# Patient Record
Sex: MALE | Race: WHITE
[De-identification: names, ages, dates, MRNs, and addresses within clinical notes are randomized per-mention and may not be internally consistent; named-entity substitution may affect disease eponyms.]

---

## 2020-07-03 ENCOUNTER — HOSPITAL ENCOUNTER (OUTPATIENT)
Dept: HOSPITAL 75 - ER | Age: 55
Setting detail: OBSERVATION
LOS: 2 days | Discharge: HOME | End: 2020-07-05
Attending: INTERNAL MEDICINE | Admitting: INTERNAL MEDICINE
Payer: COMMERCIAL

## 2020-07-03 VITALS — HEIGHT: 67.72 IN | WEIGHT: 166.23 LBS | BODY MASS INDEX: 25.49 KG/M2

## 2020-07-03 VITALS — SYSTOLIC BLOOD PRESSURE: 112 MMHG | DIASTOLIC BLOOD PRESSURE: 61 MMHG

## 2020-07-03 VITALS — DIASTOLIC BLOOD PRESSURE: 86 MMHG | SYSTOLIC BLOOD PRESSURE: 123 MMHG

## 2020-07-03 VITALS — SYSTOLIC BLOOD PRESSURE: 137 MMHG | DIASTOLIC BLOOD PRESSURE: 85 MMHG

## 2020-07-03 DIAGNOSIS — L03.115: Primary | ICD-10-CM

## 2020-07-03 LAB
ALBUMIN SERPL-MCNC: 3.8 GM/DL (ref 3.2–4.5)
ALP SERPL-CCNC: 86 U/L (ref 40–136)
ALT SERPL-CCNC: 62 U/L (ref 0–55)
BASOPHILS # BLD AUTO: 0 10^3/UL (ref 0–0.1)
BASOPHILS NFR BLD AUTO: 0 % (ref 0–10)
BILIRUB SERPL-MCNC: 0.5 MG/DL (ref 0.1–1)
BUN/CREAT SERPL: 13
CALCIUM SERPL-MCNC: 8.9 MG/DL (ref 8.5–10.1)
CHLORIDE SERPL-SCNC: 105 MMOL/L (ref 98–107)
CO2 SERPL-SCNC: 26 MMOL/L (ref 21–32)
CREAT SERPL-MCNC: 1.04 MG/DL (ref 0.6–1.3)
EOSINOPHIL # BLD AUTO: 0.3 10^3/UL (ref 0–0.3)
EOSINOPHIL NFR BLD AUTO: 2 % (ref 0–10)
ERYTHROCYTE [DISTWIDTH] IN BLOOD BY AUTOMATED COUNT: 12.8 % (ref 10–14.5)
GFR SERPLBLD BASED ON 1.73 SQ M-ARVRAT: > 60 ML/MIN
GLUCOSE SERPL-MCNC: 94 MG/DL (ref 70–105)
HCT VFR BLD CALC: 40 % (ref 40–54)
HGB BLD-MCNC: 13.7 G/DL (ref 13.3–17.7)
LYMPHOCYTES # BLD AUTO: 1.9 X 10^3 (ref 1–4)
LYMPHOCYTES NFR BLD AUTO: 18 % (ref 12–44)
MANUAL DIFFERENTIAL PERFORMED BLD QL: NO
MCH RBC QN AUTO: 30 PG (ref 25–34)
MCHC RBC AUTO-ENTMCNC: 34 G/DL (ref 32–36)
MCV RBC AUTO: 88 FL (ref 80–99)
MONOCYTES # BLD AUTO: 1.2 X 10^3 (ref 0–1)
MONOCYTES NFR BLD AUTO: 12 % (ref 0–12)
NEUTROPHILS # BLD AUTO: 7.2 X 10^3 (ref 1.8–7.8)
NEUTROPHILS NFR BLD AUTO: 68 % (ref 42–75)
PLATELET # BLD: 228 10^3/UL (ref 130–400)
PMV BLD AUTO: 11.4 FL (ref 7.4–10.4)
POTASSIUM SERPL-SCNC: 3.9 MMOL/L (ref 3.6–5)
PROT SERPL-MCNC: 6.9 GM/DL (ref 6.4–8.2)
SODIUM SERPL-SCNC: 142 MMOL/L (ref 135–145)
WBC # BLD AUTO: 10.7 10^3/UL (ref 4.3–11)

## 2020-07-03 PROCEDURE — 96365 THER/PROPH/DIAG IV INF INIT: CPT

## 2020-07-03 PROCEDURE — 85025 COMPLETE CBC W/AUTO DIFF WBC: CPT

## 2020-07-03 PROCEDURE — 36415 COLL VENOUS BLD VENIPUNCTURE: CPT

## 2020-07-03 PROCEDURE — 80053 COMPREHEN METABOLIC PANEL: CPT

## 2020-07-03 PROCEDURE — 87040 BLOOD CULTURE FOR BACTERIA: CPT

## 2020-07-03 PROCEDURE — 80048 BASIC METABOLIC PNL TOTAL CA: CPT

## 2020-07-03 PROCEDURE — 96361 HYDRATE IV INFUSION ADD-ON: CPT

## 2020-07-03 PROCEDURE — 80202 ASSAY OF VANCOMYCIN: CPT

## 2020-07-03 RX ADMIN — VANCOMYCIN HYDROCHLORIDE SCH MLS/HR: 750 INJECTION, POWDER, LYOPHILIZED, FOR SOLUTION INTRAVENOUS at 14:04

## 2020-07-03 RX ADMIN — SODIUM CHLORIDE SCH MLS/HR: 900 INJECTION, SOLUTION INTRAVENOUS at 21:15

## 2020-07-03 RX ADMIN — VANCOMYCIN HYDROCHLORIDE SCH MLS/HR: 750 INJECTION, POWDER, LYOPHILIZED, FOR SOLUTION INTRAVENOUS at 15:22

## 2020-07-03 RX ADMIN — Medication SCH ML: at 22:06

## 2020-07-03 NOTE — NUR
SAMUEL JULIA admitted to room 414-1, with an admitting diagnosis of RIGHT LOWER LEG 
CELLULITIS, on 07/03/20 from ER via W/C, accompanied by STAFF.SAMUEL DUMONT introduced to 
surroundings, call light, bed controls, phone, TV, temperature control, lights, meal times, 
smoking policy, visitor policy, side rail policy, bathrooms and showers.  Patient Rights 
given to patient in the handbook.SAMUEL DUMONT verbalizes understanding that [facility] is 
not responsible for the loss or damage to any personal effects or valuables that are kept in 
the patients posession during their hospitalization.  The following Patient Care Plans were 
discussed with the PT: Discharge Planning, PAIN CONTROL,IV THERAPY, and TESTS AND 
PROCEDURES. SAMUEL DUMONT verbalizes understanding of Interdisciplinary Patient Education. 
Patient and/or family were informed about the Rapid Response Team and its purpose.

## 2020-07-03 NOTE — XMS REPORT
Continuity of Care Document

                             Created on: 2020



SAMUEL DUMONT

External Reference #: [id number]

: 1965

Sex: Male



Demographics





                          Address                   [address], [place] [address]

 

                          Home Phone                [phone] x

 

                          Preferred Language        Unknown

 

                          Marital Status            Unknown

 

                          Anabaptism Affiliation     Unknown

 

                          Race                      Unknown

 

                          Ethnic Group              Unknown





Author





                          Organization              Unknown

 

                          Address                   Unknown

 

                          Phone                     Unavailable



              



Allergies

      



There is no data.                  



Medications

      



There is no data.                  



Problems

      



             Date Dx Coded           Attending           Type           Code    

       

Diagnosis                               Diagnosed By        

 

           2015                       Ot           592.0                  

           

  

 

           2015                       Ot           592.0                  

           

  

 

           2015                       Ot           592.0                  

           

  

 

             2016                        Ot           592.0           [facility]

ULUS OF KIDNEY

                                                 

 

             2016                        Ot           592.0           [facility]

ULUS OF KIDNEY

                                                 

 

             2018                        Ot           592.0           [facility]

ULUS OF KIDNEY

                                                 



                            



Procedures

      



There is no data.                  



Results

      



There is no data.              



Encounters

      



                ACCT No.           Visit Date/Time           Discharge          

 Status         

             Pt. Type           Provider           Facility           Loc./Unit 

          

Complaint        

 

             [id number]           2020 12:20:00                        A

CT           

Emergency                 PACO THURSTON, JOANNA GARCIA           [facility]                 ER                        R LEG RED SWOLLEN        

 

             [id number]           2013 07:02:00                         

            

Document Registration

## 2020-07-03 NOTE — ED LOWER EXTREMITY
General


Chief Complaint:  Lower Extremity


Stated Complaint:  R LEG RED SWOLLEN


Source:  patient


Exam Limitations:  no limitations





History of Present Illness


Date Seen by Provider:  Jul 3, 2020


Time Seen by Provider:  13:23


Initial Comments


Patient presents ER by private conveyance with chief complaint that her 4 days 

ago he noticed some redness swelling heat and tenderness coming on the right 

shin. No drainage. He went to Dr. Blas who put him on Rocephin and some type of

cephalosporin pill. Be followed up yesterday with Dr. Morley, General Surgery 

who lanced the wound but did not get any purulence out. Only to get sanguis 

drainage. Thought the wound looked like it might of been from a spider bite to 

put him on dapsone. Patient said it was getting worse spreading up his thigh and

having some more pain today so he was directed to the ER by Dr. Morley. Dr. Morley came to the ER and saw the patient. He agrees redness and swelling is 

worse today than before. It is not circumferential. Patient does not have 

numbness or tingling in his foot. He has full range of motion of his toes. He 

does not have any significant medical history nor take any other medications 

besides the antibiotics. No fevers or chills nausea vomiting or diarrhea.





Allergies and Home Medications


Allergies


Coded Allergies:  


     No Known Drug Allergies (Unverified , 7/3/20)





Patient Home Medication List


Home Medication List Reviewed:  Yes





Review of Systems


Constitutional:  No chills, No diaphoresis, No fever, No malaise


EENTM:  No ear pain, No eye pain


Respiratory:  No cough, No short of breath


Cardiovascular:  No Hx of Intervention, No syncope, No vascular heart diseas


Gastrointestinal:  No abdominal pain, No constipation, No diarrhea, No nausea


Genitourinary:  No discharge, No dysuria


Musculoskeletal:  No back pain, No joint pain


Skin:  see HPI





All Other Systems Reviewed


Negative Unless Noted:  Yes





Past Medical-Social-Family Hx


Patient Social History


Alcohol Use:  Denies Use


Recreational Drug Use:  No


Smoking Status:  Never a Smoker


Recent Foreign Travel:  No


Contact w/Someone Who Travel:  No





Physical Exam


Vital Signs





Vital Signs - First Documented








 7/3/20





 12:30


 


Temp 36.7


 


Pulse 64


 


Resp 18


 


B/P (MAP) 130/81 (97)


 


Pulse Ox 98





Capillary Refill :


Height, Weight, BMI


Height: '"


Weight: lbs. oz. kg;  BMI


Method:


General Appearance:  WD/WN, mild distress


HEENT:  PERRL/EOMI, pharynx normal


Cardiovascular:  normal peripheral pulses, regular rate, rhythm


Respiratory:  no respiratory distress, no accessory muscle use


Legs:  left leg non-tender, left leg normal inspection, left leg normal range of

motion, left leg no evidence of injury; right leg pain, right leg soft tissue 

tenderness, right leg swelling (the erythema with a previous incision site 

roughly 1 cm on the anterior shin mid shaft right leg. Fluctuance palpable but 

no purulence expressed. Erythema marked with a marker and spreading up posterior

thigh approximately 4-5 cm.)





Progress/Results/Core Measures


Results/Orders


Lab Results





Laboratory Tests








Test


 7/3/20


12:55 Range/Units


 


 


White Blood Count


 10.7 


 4.3-11.0


10^3/uL


 


Red Blood Count


 4.59 


 4.35-5.85


10^6/uL


 


Hemoglobin 13.7  13.3-17.7  G/DL


 


Hematocrit 40  40-54  %


 


Mean Corpuscular Volume 88  80-99  FL


 


Mean Corpuscular Hemoglobin 30  25-34  PG


 


Mean Corpuscular Hemoglobin


Concent 34 


 32-36  G/DL





 


Red Cell Distribution Width 12.8  10.0-14.5  %


 


Platelet Count


 228 


 130-400


10^3/uL


 


Mean Platelet Volume 11.4 H 7.4-10.4  FL


 


Neutrophils (%) (Auto) 68  42-75  %


 


Lymphocytes (%) (Auto) 18  12-44  %


 


Monocytes (%) (Auto) 12  0-12  %


 


Eosinophils (%) (Auto) 2  0-10  %


 


Basophils (%) (Auto) 0  0-10  %


 


Neutrophils # (Auto) 7.2  1.8-7.8  X 10^3


 


Lymphocytes # (Auto) 1.9  1.0-4.0  X 10^3


 


Monocytes # (Auto) 1.2 H 0.0-1.0  X 10^3


 


Eosinophils # (Auto)


 0.3 


 0.0-0.3


10^3/uL


 


Basophils # (Auto)


 0.0 


 0.0-0.1


10^3/uL


 


Sodium Level 142  135-145  MMOL/L


 


Potassium Level 3.9  3.6-5.0  MMOL/L


 


Chloride Level 105    MMOL/L


 


Carbon Dioxide Level 26  21-32  MMOL/L


 


Anion Gap 11  5-14  MMOL/L


 


Blood Urea Nitrogen 14  7-18  MG/DL


 


Creatinine


 1.04 


 0.60-1.30


MG/DL


 


Estimat Glomerular Filtration


Rate > 60 


  





 


BUN/Creatinine Ratio 13   


 


Glucose Level 94    MG/DL


 


Calcium Level 8.9  8.5-10.1  MG/DL


 


Corrected Calcium 9.1  8.5-10.1  MG/DL


 


Total Bilirubin 0.5  0.1-1.0  MG/DL


 


Aspartate Amino Transf


(AST/SGOT) 45 H


 5-34  U/L





 


Alanine Aminotransferase


(ALT/SGPT) 62 H


 0-55  U/L





 


Alkaline Phosphatase 86    U/L


 


Total Protein 6.9  6.4-8.2  GM/DL


 


Albumin 3.8  3.2-4.5  GM/DL








My Orders





Orders - JOANNA ANTONIO


Cbc With Automated Diff (7/3/20 13:10)


Comprehensive Metabolic Panel (7/3/20 13:10)


Blood Culture (7/3/20 13:10)


Ed Iv/Invasive Line Start (7/3/20 13:43)


Ns Iv 1000 Ml (Sodium Chloride 0.9%) (7/3/20 13:43)





Vital Signs/I&O











 7/3/20





 12:30


 


Temp 36.7


 


Pulse 64


 


Resp 18


 


B/P (MAP) 130/81 (97)


 


Pulse Ox 98











Progress


Progress Note :  


   Time:  13:58


Progress Note


Aseptic vital signs. She has been on 3 days of outpatient antibiotics including 

Rocephin. Plan to put him on vancomycin for MRSA coverage and Zosyn and observe 

him overnight. Internal Medicine can downgrade this in the morning if he is 

clinically improving. Labs are unremarkable.





Departure


Communication (Admissions)


Time/Spoke to Admitting Phy:  13:50


Discussed the case with Dr. Garcia and he agrees to observe the patient on 

antibiotics.


Time/Spoke to Consulting Phy:  13:30


Dr. Morley agrees to consult. He says he will check out to Dr. Ron arambula.





Impression





   Primary Impression:  


   Cellulitis and abscess of leg, except foot


Disposition:  09 ADMITTED AS INPATIENT


Condition:  Stable





Admissions


Decision to Admit Reason:  Admit from ER (General)


Decision to Admit/Date:  Jul 3, 2020


Time/Decision to Admit Time:  13:30





Departure-Patient Inst.


Referrals:  


NO,LOCAL PHYSICIAN (PCP/Family)


Primary Care Physician











JOANNA ANTONIO                  Jul 3, 2020 14:00

## 2020-07-03 NOTE — NUR
CR 1.04; CR CL > 60; WT 75.4 KG; VANCO 1750 MG GIVEN IN ER; CONTINUE WITH VANCO 1000 MG IV 
Q12H; TROUGH AFTER 4TH DOSE

## 2020-07-03 NOTE — XMS REPORT
Continuity of Care Document

                             Created on: 2020



SAMUEL DUMONT

External Reference #: [id number]

: 1965

Sex: Male



Demographics





                          Address                   [address] [address]  [address]

 

                          Home Phone                [phone] x

 

                          Preferred Language        Unknown

 

                          Marital Status            Unknown

 

                          Zoroastrian Affiliation     Unknown

 

                          Race                      Unknown

 

                          Ethnic Group              Unknown





Author





                          Organization              Unknown

 

                          Address                   Unknown

 

                          Phone                     Unavailable



              



Allergies

      



             Active           Description           Code           Type         

  Severity   

                Reaction           Onset           Reported/Identified          

 

Relationship to Patient                 Clinical Status        

 

                Yes             No Known Drug Allergies           [id number]    

       Drug 

Allergy           Unknown           N/A                             2020  

      

                                                             



                  



Medications

      



There is no data.                  



Problems

      



             Date Dx Coded           Attending           Type           Code    

       

Diagnosis                               Diagnosed By        

 

           2015                       Ot           592.0                  

           

  

 

           2015                       Ot           592.0                  

           

  

 

           2015                       Ot           592.0                  

           

  

 

             2016                        Ot           592.0           [facility]

ULUS OF KIDNEY

                                                 

 

             2016                        Ot           592.0           [facility]

ULUS OF KIDNEY

                                                 

 

             2018                        Ot           592.0           [facility]

ULUS OF KIDNEY

                                                 



                            



Procedures

      



There is no data.                  



Results

      



                    Test                Result              Range        

 

                                        Complete blood count (CBC) with automate

d white blood cell (WBC) differential - 

20 12:55         

 

                          Blood leukocytes automated count (number/volume)      

     10.7 10*3/uL         

                                        4.3-11.0        

 

                          Blood erythrocytes automated count (number/volume)    

       4.59 10*6/uL       

                                        4.35-5.85        

 

                    Venous blood hemoglobin measurement (mass/volume)           

13.7 g/dL           

13.3-17.7        

 

                    Blood hematocrit (volume fraction)           40 %           

     40-54        

 

                    Automated erythrocyte mean corpuscular volume           88 [

foz_us]           

80-99        

 

                                        Automated erythrocyte mean corpuscular h

emoglobin (mass per erythrocyte)        

                          30 pg                     25-34        

 

                                        Automated erythrocyte mean corpuscular h

emoglobin concentration measurement 

(mass/volume)             34 g/dL                   32-36        

 

                    Automated erythrocyte distribution width ratio           12.

8 %              10.0-

14.5        

 

                    Automated blood platelet count (count/volume)           228 

10*3/uL           

130-400        

 

                          Automated blood platelet mean volume measurement      

     11.4 [foz_us]        

                                        7.4-10.4        

 

                    Automated blood neutrophils/100 leukocytes           68 %   

             42-75       

 

 

                    Automated blood lymphocytes/100 leukocytes           18 %   

             12-44       

 

 

                    Blood monocytes/100 leukocytes           12 %               

 0-12        

 

                    Automated blood eosinophils/100 leukocytes           2 %    

             0-10        

 

                    Automated blood basophils/100 leukocytes           0 %      

           0-10        

 

                    Blood neutrophils automated count (number/volume)           

7.2 10*3            

1.8-7.8        

 

                    Blood lymphocytes automated count (number/volume)           

1.9 10*3            

1.0-4.0        

 

                    Blood monocytes automated count (number/volume)           1.

2 10*3            

0.0-1.0        

 

                    Automated eosinophil count           0.3 10*3/uL           0

.0-0.3        

 

                    Automated blood basophil count (count/volume)           0.0 

10*3/uL           

0.0-0.1        

 

                                        Comprehensive metabolic panel - 20

 12:55         

 

                          Serum or plasma sodium measurement (moles/volume)     

      142 mmol/L          

                                        135-145        

 

                          Serum or plasma potassium measurement (moles/volume)  

         3.9 mmol/L       

                                        3.6-5.0        

 

                          Serum or plasma chloride measurement (moles/volume)   

        105 mmol/L        

                                                

 

                    Carbon dioxide           26 mmol/L           21-32        

 

                          Serum or plasma anion gap determination (moles/volume)

           11 mmol/L      

                                        5-14        

 

                          Serum or plasma urea nitrogen measurement (mass/volume

)           14 mg/dL      

                                        7-18        

 

                          Serum or plasma creatinine measurement (mass/volume)  

         1.04 mg/dL       

                                        0.60-1.30        

 

                    Serum or plasma urea nitrogen/creatinine mass ratio         

  13                  NRG 

       

 

                                        Serum or plasma creatinine measurement w

ith calculation of estimated glomerular 

filtration rate           >                         NRG        

 

                    Serum or plasma glucose measurement (mass/volume)           

94 mg/dL            

        

 

                    Serum or plasma calcium measurement (mass/volume)           

8.9 mg/dL           

8.5-10.1        

 

                          Serum or plasma total bilirubin measurement (mass/volu

me)           0.5 mg/dL   

                                        0.1-1.0        

 

                                        Serum or plasma alkaline phosphatase kalyan

surement (enzymatic activity/volume)    

                          86 U/L                            

 

                                        Serum or plasma aspartate aminotransfera

se measurement (enzymatic 

activity/volume)           45 U/L                    5-34        

 

                                        Serum or plasma alanine aminotransferase

 measurement (enzymatic activity/volume)

                          62 U/L                    0-55        

 

                    Serum or plasma protein measurement (mass/volume)           

6.9 g/dL            

6.4-8.2        

 

                    Serum or plasma albumin measurement (mass/volume)           

3.8 g/dL            

3.2-4.5        

 

                    CALCIUM CORRECTED           9.1 mg/dL           8.5-10.1    

    



                  



Encounters

      



                ACCT No.           Visit Date/Time           Discharge          

 Status         

             Pt. Type           Provider           Facility           Loc./Unit 

          

Complaint        

 

             [id number]           2020 13:50:00                        A

CT           

Inpatient                 MATILDA THURSTON, DREW COTA           [facility]                 4TH                       CELLULITIS RLE        

 

             [id number]           2013 07:02:00                         

            

Document Registration

## 2020-07-04 VITALS — DIASTOLIC BLOOD PRESSURE: 67 MMHG | SYSTOLIC BLOOD PRESSURE: 117 MMHG

## 2020-07-04 VITALS — SYSTOLIC BLOOD PRESSURE: 119 MMHG | DIASTOLIC BLOOD PRESSURE: 65 MMHG

## 2020-07-04 VITALS — DIASTOLIC BLOOD PRESSURE: 67 MMHG | SYSTOLIC BLOOD PRESSURE: 127 MMHG

## 2020-07-04 VITALS — DIASTOLIC BLOOD PRESSURE: 69 MMHG | SYSTOLIC BLOOD PRESSURE: 123 MMHG

## 2020-07-04 VITALS — SYSTOLIC BLOOD PRESSURE: 106 MMHG | DIASTOLIC BLOOD PRESSURE: 59 MMHG

## 2020-07-04 VITALS — SYSTOLIC BLOOD PRESSURE: 106 MMHG | DIASTOLIC BLOOD PRESSURE: 58 MMHG

## 2020-07-04 LAB
BASOPHILS # BLD AUTO: 0 10^3/UL (ref 0–0.1)
BASOPHILS NFR BLD AUTO: 0 % (ref 0–10)
BUN/CREAT SERPL: 9
CALCIUM SERPL-MCNC: 8.9 MG/DL (ref 8.5–10.1)
CHLORIDE SERPL-SCNC: 106 MMOL/L (ref 98–107)
CO2 SERPL-SCNC: 25 MMOL/L (ref 21–32)
CREAT SERPL-MCNC: 1.29 MG/DL (ref 0.6–1.3)
EOSINOPHIL # BLD AUTO: 0.3 10^3/UL (ref 0–0.3)
EOSINOPHIL NFR BLD AUTO: 3 % (ref 0–10)
ERYTHROCYTE [DISTWIDTH] IN BLOOD BY AUTOMATED COUNT: 12.6 % (ref 10–14.5)
GFR SERPLBLD BASED ON 1.73 SQ M-ARVRAT: 58 ML/MIN
GLUCOSE SERPL-MCNC: 98 MG/DL (ref 70–105)
HCT VFR BLD CALC: 39 % (ref 40–54)
HGB BLD-MCNC: 13.1 G/DL (ref 13.3–17.7)
LYMPHOCYTES # BLD AUTO: 1.8 X 10^3 (ref 1–4)
LYMPHOCYTES NFR BLD AUTO: 17 % (ref 12–44)
MANUAL DIFFERENTIAL PERFORMED BLD QL: NO
MCH RBC QN AUTO: 29 PG (ref 25–34)
MCHC RBC AUTO-ENTMCNC: 33 G/DL (ref 32–36)
MCV RBC AUTO: 88 FL (ref 80–99)
MONOCYTES # BLD AUTO: 1.2 X 10^3 (ref 0–1)
MONOCYTES NFR BLD AUTO: 11 % (ref 0–12)
NEUTROPHILS # BLD AUTO: 7.5 X 10^3 (ref 1.8–7.8)
NEUTROPHILS NFR BLD AUTO: 70 % (ref 42–75)
PLATELET # BLD: 238 10^3/UL (ref 130–400)
PMV BLD AUTO: 11.5 FL (ref 7.4–10.4)
POTASSIUM SERPL-SCNC: 4 MMOL/L (ref 3.6–5)
SODIUM SERPL-SCNC: 141 MMOL/L (ref 135–145)
WBC # BLD AUTO: 10.8 10^3/UL (ref 4.3–11)

## 2020-07-04 RX ADMIN — VANCOMYCIN HYDROCHLORIDE SCH MLS/HR: 500 INJECTION, POWDER, LYOPHILIZED, FOR SOLUTION INTRAVENOUS at 02:19

## 2020-07-04 RX ADMIN — Medication SCH ML: at 22:16

## 2020-07-04 RX ADMIN — SODIUM CHLORIDE SCH MLS/HR: 900 INJECTION, SOLUTION INTRAVENOUS at 13:40

## 2020-07-04 RX ADMIN — VANCOMYCIN HYDROCHLORIDE SCH MLS/HR: 500 INJECTION, POWDER, LYOPHILIZED, FOR SOLUTION INTRAVENOUS at 13:56

## 2020-07-04 RX ADMIN — SODIUM CHLORIDE SCH MLS/HR: 900 INJECTION, SOLUTION INTRAVENOUS at 05:46

## 2020-07-04 RX ADMIN — IBUPROFEN PRN MG: 800 TABLET, FILM COATED ORAL at 14:02

## 2020-07-04 RX ADMIN — Medication SCH ML: at 06:01

## 2020-07-04 RX ADMIN — SODIUM CHLORIDE SCH MLS/HR: 900 INJECTION, SOLUTION INTRAVENOUS at 22:16

## 2020-07-04 RX ADMIN — Medication SCH ML: at 13:56

## 2020-07-04 NOTE — HISTORY & PHYSICAL-HOSPITALIST
History of Present Illness


HPI/Chief Complaint


Initially the patient reports couple weeks ago noticing increased dryness and 

itching of his legs especially the right one down around the ankle and lower 

tibial area.  Last Saturday he noted a little pain and some swelling in the mid 

tibial area got progressively worse and he presented to Dr. Carrillo's office 

on Tuesday where he had evidence for what sounds like an abscess.  He was 

concerned that it might be a spider bite but was not aware of any insect bites 

and not seen any spiders.  He was started on cephalexin but swelling got worse 

and he was referred to Dr. Morley who attempted to duane nodule in the distal 

tibia from description they were unable to get the purulent material.  On 

cephalexin swelling up progressively worse he had now some chills and fever 

Friday afternoon or early evening which prompted him to come to the hospital.  

He also started noticing redness moving up to the medial thigh area with 

increasing pain around the nodule sided redness spreading out from it as well.  

He denied any drainage and is had no past history of MRSA skin infection or any 

other type of infection.  He is typically healthy on no regular medication and 

works out on a regular basis.  He has not had any known exposure to COVID or 

anyone with MRSA that he is aware of.


Date Seen


20


Time Seen by a Provider:  11:00


Attending Physician


Drew Garcia MD


PCP


No,Local Physician


Referring Physician





Date of Admission


Jul 3, 2020 at 13:50





Home Medications & Allergies


Home Medications


Reviewed patient Home Medication Reconciliation performed by pharmacy medication

reconciliations technician and/or nursing.


Patients Allergies have been reviewed.





Allergies





Allergies


Coded Allergies


  No Known Drug Allergies (Unverified7/3/20)








Past Medical-Social-Family Hx


Past Med/Social Hx:  Reviewed and Corrections made


Patient Social History


Alcohol Use:  Denies Use


Alcohol Beverage of Choice:  Beer


Recreational Drug Use:  No


Smoking Status:  Never a Smoker


Physical Abuse Screen:  No


Sexual Abuse:  No


Recent Foreign Travel:  No


Contact w/other who traveled:  No


Recent Hopitalizations:  No


Recent Infectious Disease Expo:  No





Seasonal Allergies


Seasonal Allergies:  No





Past Medical History


History of Blood Disorders:  No


Adverse Reaction to Blood Bryson:  No





Family History


No Pertinent Family Hx





Review of Systems


Constitutional:  see HPI, chills, diaphoresis, fever, weakness





Physical Exam


Physical Exam


Vital Signs





Vital Signs - First Documented








 7/3/20 7/3/20





 12:30 15:05


 


Temp 36.7 


 


Pulse 64 


 


Resp 18 


 


B/P (MAP) 130/81 (97) 


 


Pulse Ox 98 


 


O2 Delivery  Room Air





Capillary Refill : Less Than 3 Seconds


Height, Weight, BMI


Height: '"


Weight: lbs. oz. kg; 25.48 BMI


Method:


General Appearance:  No Apparent Distress, WD/WN


HEENT:  PERRL/EOMI, TMs Normal, Normal ENT Inspection, Pharynx Normal, Moist 

Mucous Membranes


Respiratory:  Chest Non Tender, Lungs Clear, Normal Breath Sounds, No Accessory 

Muscle Use, No Respiratory Distress


Cardiovascular:  Regular Rate, Rhythm, No Edema, No Gallop, No JVD, No Murmur, 

Normal Peripheral Pulses


Gastrointestinal:  Normal Bowel Sounds, No Organomegaly, No Pulsatile Mass, Non 

Tender, Soft


Extremity:  Other (2 x 3 cm erythematous nodule with a small adjacent incision 

which is erythematous and slightly tender.  There is no evidence of for drainage

there is erythema extending out from this man extending up the right medial 

calf.  There appears to be less erythema than what is marked out by the 

emergency room physician last night and he reports decrease in pain and 

swelling.  Left lower extremity is unremarkable no skin is a dry no evidence for

tenia pedis is noted.  Left lower extremity is unremarkable with no swelling)


Neurologic/Psychiatric:  Alert, Oriented x3, No Motor/Sensory Deficits, Normal 

Mood/Affect





Results


Results/Procedures


Labs


Laboratory Tests


7/3/20 12:55








20 05:11








Patient resulted labs reviewed.





Assessment/Plan


Admission Diagnosis


A/P 1.  Cellulitis with ascending lymphangitis and sepsis not severe suspect 

underlying staph continue vancomycin and for now continue Zosyn and check 

outpatient culture results.  Blood cultures pending.


2.  Patient low risk for DVT risk of anticoagulation outweigh potential benefits

due to lower extremity cellulitis not a candidate for SCDs.


Admission Status:  Inpatient Order (span 2 midnights)


Reason for Inpatient Admission:  


As per above.





Clinical Quality Measures


DVT/VTE Risk/Contraindication:


Risk Factor Score Per Nursin


RFS Level Per Nursing on Admit:  2=Moderate





Copy


Copies To 1:   DREW GARCIA MD, MARK D MD               2020 12:28

## 2020-07-04 NOTE — PROGRESS NOTE
Subjective


Date Seen by a Provider:  2020


Time Seen by a Provider:  10:30


Subjective/Events-last exam


Patient seen with Dr. Velasquez.  Patient reports the RLE is improving.  Having 

little pain but is tolerable with pain meds.  Did have a temp last night, but 

pain reports that he gets hot after taking pain medications and was sweating.  

Denies any drainage.





Objective


Exam





Vital Signs








  Date Time  Temp Pulse Resp B/P (MAP) Pulse Ox O2 Delivery O2 Flow Rate FiO2


 


20 09:00     93 Room Air  


 


20 08:00 36.4 52 20 127/67 (87) 96 Room Air  


 


20 04:00 37.6 59 18 123/69 (87) 92 Room Air  


 


20 00:00 37.8 57 18 106/58 (74) 95 Room Air  


 


7/3/20 21:00     93 Room Air  


 


7/3/20 19:27 38.1 64 18 112/61 (78) 93 Room Air  


 


7/3/20 17:43     98 Room Air  


 


7/3/20 15:12 36.9 62 18 137/85 96 Room Air  


 


7/3/20 15:05 36.9 78 18 123/86 (98) 99 Room Air  


 


7/3/20 14:50  64 18 122/78 98   


 


7/3/20 12:30 36.7 64 18 130/81 (97) 98   














I & O 


 


 20





 07:00


 


Intake Total 2965 ml


 


Balance 2965 ml





Capillary Refill : Less Than 3 Seconds


General Appearance:  No Apparent Distress, WD/WN


Neck:  Normal Inspection, Non Tender, Supple


Respiratory:  Normal Breath Sounds, No Accessory Muscle Use, No Respiratory 

Distress


Cardiovascular:  Regular Rate, Rhythm, No Edema


Gastrointestinal:  normal bowel sounds, non tender, soft


Extremity:  Normal Range of Motion, Other (The area of the RLE does still have 

some redness and erythema but is improved from previous outline.  Mild edema 

still noted.  No drainage)


Neurologic/Psychiatric:  Alert, Oriented x3


Skin:  Normal Color, Warm/Dry





Results


Lab


Laboratory Tests


7/3/20 12:55: 


White Blood Count 10.7, Red Blood Count 4.59, Hemoglobin 13.7, Hematocrit 40, 

Mean Corpuscular Volume 88, Mean Corpuscular Hemoglobin 30, Mean Corpuscular H

emoglobin Concent 34, Red Cell Distribution Width 12.8, Platelet Count 228, Mean

Platelet Volume 11.4H, Neutrophils (%) (Auto) 68, Lymphocytes (%) (Auto) 18, 

Monocytes (%) (Auto) 12, Eosinophils (%) (Auto) 2, Basophils (%) (Auto) 0, Ne

utrophils # (Auto) 7.2, Lymphocytes # (Auto) 1.9, Monocytes # (Auto) 1.2H, 

Eosinophils # (Auto) 0.3, Basophils # (Auto) 0.0, Sodium Level 142, Potassium 

Level 3.9, Chloride Level 105, Carbon Dioxide Level 26, Anion Gap 11, Blood Urea

Nitrogen 14, Creatinine 1.04, Estimat Glomerular Filtration Rate > 60, 

BUN/Creatinine Ratio 13, Glucose Level 94, Calcium Level 8.9, Corrected Calcium 

9.1, Total Bilirubin 0.5, Aspartate Amino Transf (AST/SGOT) 45H, Alanine A

minotransferase (ALT/SGPT) 62H, Alkaline Phosphatase 86, Total Protein 6.9, 

Albumin 3.8


20 05:11: 


White Blood Count 10.8, Red Blood Count 4.45, Hemoglobin 13.1L, Hematocrit 39L, 

Mean Corpuscular Volume 88, Mean Corpuscular Hemoglobin 29, Mean Corpuscular 

Hemoglobin Concent 33, Red Cell Distribution Width 12.6, Platelet Count 238, 

Mean Platelet Volume 11.5H, Neutrophils (%) (Auto) 70, Lymphocytes (%) (Auto) 

17, Monocytes (%) (Auto) 11, Eosinophils (%) (Auto) 3, Basophils (%) (Auto) 0, 

Neutrophils # (Auto) 7.5, Lymphocytes # (Auto) 1.8, Monocytes # (Auto) 1.2H, 

Eosinophils # (Auto) 0.3, Basophils # (Auto) 0.0, Sodium Level 141, Potassium 

Level 4.0, Chloride Level 106, Carbon Dioxide Level 25, Anion Gap 10, Blood Urea

Nitrogen 11, Creatinine 1.29, Estimat Glomerular Filtration Rate 58, 

BUN/Creatinine Ratio 9, Glucose Level 98, Calcium Level 8.9





Assessment/Plan


Assessment/Plan


Assess & Plan/Chief Complaint


A 55 year old male with RLE cellulitis


VSS


WBC 10.8


Cellulitis improving and no abscess identified


Continue IV abx, pain and nausea medications





Clinical Quality Measures


DVT/VTE Risk/Contraindication:


Risk Factor Score Per Nursin


RFS Level Per Nursing on Admit:  2=Moderate











LUIS CALLES APRN           2020 11:18

## 2020-07-05 VITALS — SYSTOLIC BLOOD PRESSURE: 123 MMHG | DIASTOLIC BLOOD PRESSURE: 70 MMHG

## 2020-07-05 VITALS — DIASTOLIC BLOOD PRESSURE: 74 MMHG | SYSTOLIC BLOOD PRESSURE: 120 MMHG

## 2020-07-05 VITALS — SYSTOLIC BLOOD PRESSURE: 129 MMHG | DIASTOLIC BLOOD PRESSURE: 76 MMHG

## 2020-07-05 VITALS — DIASTOLIC BLOOD PRESSURE: 75 MMHG | SYSTOLIC BLOOD PRESSURE: 124 MMHG

## 2020-07-05 LAB
BASOPHILS # BLD AUTO: 0 10^3/UL (ref 0–0.1)
BASOPHILS NFR BLD AUTO: 0 % (ref 0–10)
BUN/CREAT SERPL: 10
CALCIUM SERPL-MCNC: 8.9 MG/DL (ref 8.5–10.1)
CHLORIDE SERPL-SCNC: 108 MMOL/L (ref 98–107)
CO2 SERPL-SCNC: 22 MMOL/L (ref 21–32)
CREAT SERPL-MCNC: 1.14 MG/DL (ref 0.6–1.3)
EOSINOPHIL # BLD AUTO: 0.4 10^3/UL (ref 0–0.3)
EOSINOPHIL NFR BLD AUTO: 4 % (ref 0–10)
ERYTHROCYTE [DISTWIDTH] IN BLOOD BY AUTOMATED COUNT: 12.9 % (ref 10–14.5)
GFR SERPLBLD BASED ON 1.73 SQ M-ARVRAT: > 60 ML/MIN
GLUCOSE SERPL-MCNC: 100 MG/DL (ref 70–105)
HCT VFR BLD CALC: 40 % (ref 40–54)
HGB BLD-MCNC: 13.5 G/DL (ref 13.3–17.7)
LYMPHOCYTES # BLD AUTO: 1.7 X 10^3 (ref 1–4)
LYMPHOCYTES NFR BLD AUTO: 19 % (ref 12–44)
MANUAL DIFFERENTIAL PERFORMED BLD QL: NO
MCH RBC QN AUTO: 29 PG (ref 25–34)
MCHC RBC AUTO-ENTMCNC: 34 G/DL (ref 32–36)
MCV RBC AUTO: 87 FL (ref 80–99)
MONOCYTES # BLD AUTO: 1.1 X 10^3 (ref 0–1)
MONOCYTES NFR BLD AUTO: 11 % (ref 0–12)
NEUTROPHILS # BLD AUTO: 6 X 10^3 (ref 1.8–7.8)
NEUTROPHILS NFR BLD AUTO: 66 % (ref 42–75)
PLATELET # BLD: 271 10^3/UL (ref 130–400)
PMV BLD AUTO: 11.2 FL (ref 7.4–10.4)
POTASSIUM SERPL-SCNC: 4 MMOL/L (ref 3.6–5)
SODIUM SERPL-SCNC: 142 MMOL/L (ref 135–145)
WBC # BLD AUTO: 9.2 10^3/UL (ref 4.3–11)

## 2020-07-05 RX ADMIN — VANCOMYCIN HYDROCHLORIDE SCH MLS/HR: 500 INJECTION, POWDER, LYOPHILIZED, FOR SOLUTION INTRAVENOUS at 02:23

## 2020-07-05 RX ADMIN — SODIUM CHLORIDE SCH MLS/HR: 900 INJECTION, SOLUTION INTRAVENOUS at 06:07

## 2020-07-05 RX ADMIN — VANCOMYCIN HYDROCHLORIDE SCH MLS/HR: 500 INJECTION, POWDER, LYOPHILIZED, FOR SOLUTION INTRAVENOUS at 13:34

## 2020-07-05 RX ADMIN — Medication SCH ML: at 06:07

## 2020-07-05 RX ADMIN — IBUPROFEN PRN MG: 800 TABLET, FILM COATED ORAL at 06:07

## 2020-07-05 NOTE — NUR
DR. CASTANON HERE TO SEE PATIENT. PLAN FOR DC TODAY. PATIENT DENIES MUCH LEG PAIN, ADMITS TO 
SORENESS WHEN WALKING ON RIGHT LEG. STATES SWELLING DOWN. AREA SHRINKING FROM OUTLINED AREA 
ON RIGHT LOWER LEG.

## 2020-07-05 NOTE — DISCHARGE INST-SURGICAL
D/C Lap Instructions-ANABELAO


Reconcile Patient Problems


Problems Reviewed?:  Yes


New, Converted, or Re-Newed RX:  RX on Chart


Follow Up Appt as needed or not improving





Activity as tolerated


No driving while on pain medications





Regular Diet





Symptoms to Report: Fever over 101 degree F, Nausea/Vomiting 


Infection Signs and Symptoms to report:  Increased redness, Foul odor of wound, 

Increased drainage





Bathing instructions: May shower


Operative Area Clean/Dry;  Keep incision clean/dry





If any problems/questions: Contact your physician or go to Emergency Room











LUIS CALLES           Jul 5, 2020 10:32

## 2020-07-05 NOTE — PROGRESS NOTE
Subjective


Date Seen by a Provider:  2020


Time Seen by a Provider:  09:30


Subjective/Events-last exam


Patient seen with Dr. Velasquez.  Patient reports that RLE continues to improve.  

Denies any fever/chills as well as no pain currently.





Objective


Exam





Vital Signs








  Date Time  Temp Pulse Resp B/P (MAP) Pulse Ox O2 Delivery O2 Flow Rate FiO2


 


20 09:19      Room Air  


 


20 08:00 37.0 59 18 123/70 (87) 94 Room Air  


 


20 04:00 37.1 58 20 129/76 (93) 96 Room Air  


 


20 00:00 37.1 55 20 124/75 (91) 96 Room Air  


 


20 19:59 37.1 57 20 117/67 (84) 98 Room Air  


 


20 19:25      Room Air  


 


20 16:12 36.9 55 18 119/65 (83) 95 Room Air  


 


20 14:35 36.9       


 


20 14:02 36.7       


 


20 12:00 36.7 53 20 106/59 (75) 96 Room Air  














I & O 


 


 20





 07:00


 


Intake Total 3670 ml


 


Balance 3670 ml





Capillary Refill : Less Than 3 Seconds


General Appearance:  No Apparent Distress, WD/WN


Neck:  Normal Inspection, Non Tender, Supple


Respiratory:  Normal Breath Sounds, No Accessory Muscle Use, No Respiratory 

Distress


Cardiovascular:  Regular Rate, Rhythm, No Edema


Gastrointestinal:  normal bowel sounds, non tender, soft


Extremity:  Normal Range of Motion, Other (There is a continued area of the RLE 

with mild redness, erythema, warmth.  No pain with palpation.  Mild edema.  The 

area does continue to improve.)


Neurologic/Psychiatric:  Alert, Oriented x3





Results


Lab


Laboratory Tests


20 13:00: Vancomycin Level Trough 8.1L


20 06:00: 


Vancomycin Level Trough 21.9H, White Blood Count 9.2, Red Blood Count 4.60, He

moglobin 13.5, Hematocrit 40, Mean Corpuscular Volume 87, Mean Corpuscular 

Hemoglobin 29, Mean Corpuscular Hemoglobin Concent 34, Red Cell Distribution 

Width 12.9, Platelet Count 271, Mean Platelet Volume 11.2H, Neutrophils (%) 

(Auto) 66, Lymphocytes (%) (Auto) 19, Monocytes (%) (Auto) 11, Eosinophils (%) 

(Auto) 4, Basophils (%) (Auto) 0, Neutrophils # (Auto) 6.0, Lymphocytes # (Auto)

1.7, Monocytes # (Auto) 1.1H, Eosinophils # (Auto) 0.4H, Basophils # (Auto) 0.0,

Sodium Level 142, Potassium Level 4.0, Chloride Level 108H, Carbon Dioxide Level

22, Anion Gap 12, Blood Urea Nitrogen 11, Creatinine 1.14, Estimat Glomerular 

Filtration Rate > 60, BUN/Creatinine Ratio 10, Glucose Level 100, Calcium Level 

8.9





Microbiology


7/3/20 Blood Culture - Preliminary, Resulted


         No growth





Assessment/Plan


Assessment/Plan


Assess & Plan/Chief Complaint


A 55 year old male with RLE cellulitis


VSS


WBC WNL


Cellulitis improving and no abscess identified


Will DC home with PO abx and pain medication





Clinical Quality Measures


DVT/VTE Risk/Contraindication:


Risk Factor Score Per Nursin


RFS Level Per Nursing on Admit:  2=Moderate











LUIS CALLES APRN           2020 10:43

## 2020-08-26 NOTE — HISTORY AND PHYSICAL
DATE OF SERVICE:  



COLONOSCOPY HISTORY AND PHYSICAL



HISTORY OF PRESENT ILLNESS:

The patient is a 55-year-old white male seen for yearly wellness evaluation on

08/20/2020.  He had not previously had screening colonoscopy, thus being set up

for this.  He is deemed to be of average risk as he is not aware of any family

history for colon cancer or polyps.  He denies any bowel habit change, bright

red blood per rectum, melena or abdominal pain.  Weight has been stable.



PAST MEDICAL HISTORY:

Right lower extremity cellulitis.  He did require hospitalization on the 3rd of July of this year.  He has had complete recovery since that time.  He reports

that he feels well and is on no medication.



SOCIAL HISTORY:

He works for [employer] with no past smoking history and no history of any

significant alcohol use.



FAMILY HISTORY:

Father is living at the age of 72.  He does not know much about his health

history.  Mother is living at the age of 72 and has no health problems.  He has

one sister alive and well in her 50s.  He is not aware of any family history of

malignancy.



PHYSICAL EXAMINATION:

GENERAL:  Reveals a white male, appears to be in no acute distress.

HEENT:  Unremarkable.  Mallampati 1 pharyngeal configuration.

NECK:  Revealed no JVD, adenopathy or bruits.

EARS:  Ear canals clear with normal TMs.

CHEST:  Clear to auscultation.

CARDIOVASCULAR:  Reveals a regular rate and rhythm without murmur, S3 or S4.

ABDOMEN:  Soft, supple without mass, organomegaly or tenderness.  No bruits are

noted.

EXTREMITIES:  Reveal no cyanosis, clubbing or edema and minimal amount of

post-inflammatory hyperpigmentation noted at the site of cellulitis and small

scar at the site of abscess I and D.  No evidence for infection recurrence.



ASSESSMENT:

Unremarkable wellness evaluation.  After discussing rationale, the patient is

set up for screening colonoscopy on 09/04/2020.  Prep instructions with Suprep

kit were given and questions were answered.





Job ID: [id number]

DocumentID: [id number]

Dictated Date:  08/20/2020 16:09:52

Transcription Date: 08/20/2020 16:24:47

Dictated By: DREW GREY MD

## 2020-08-31 ENCOUNTER — HOSPITAL ENCOUNTER (OUTPATIENT)
Dept: HOSPITAL 75 - PREOP | Age: 55
Discharge: HOME | End: 2020-08-31
Attending: INTERNAL MEDICINE
Payer: COMMERCIAL

## 2020-08-31 VITALS — BODY MASS INDEX: 24.57 KG/M2 | WEIGHT: 160.28 LBS | HEIGHT: 67.72 IN

## 2020-08-31 DIAGNOSIS — Z01.818: Primary | ICD-10-CM

## 2020-09-04 ENCOUNTER — HOSPITAL ENCOUNTER (OUTPATIENT)
Dept: HOSPITAL 75 - ENDO | Age: 55
Discharge: HOME | End: 2020-09-04
Attending: INTERNAL MEDICINE
Payer: COMMERCIAL

## 2020-09-04 VITALS — SYSTOLIC BLOOD PRESSURE: 104 MMHG | DIASTOLIC BLOOD PRESSURE: 61 MMHG

## 2020-09-04 VITALS — SYSTOLIC BLOOD PRESSURE: 93 MMHG | DIASTOLIC BLOOD PRESSURE: 57 MMHG

## 2020-09-04 VITALS — SYSTOLIC BLOOD PRESSURE: 117 MMHG | DIASTOLIC BLOOD PRESSURE: 63 MMHG

## 2020-09-04 VITALS — SYSTOLIC BLOOD PRESSURE: 114 MMHG | DIASTOLIC BLOOD PRESSURE: 64 MMHG

## 2020-09-04 VITALS — WEIGHT: 160.28 LBS | BODY MASS INDEX: 24.57 KG/M2 | HEIGHT: 67.72 IN

## 2020-09-04 VITALS — SYSTOLIC BLOOD PRESSURE: 112 MMHG | DIASTOLIC BLOOD PRESSURE: 60 MMHG

## 2020-09-04 VITALS — DIASTOLIC BLOOD PRESSURE: 61 MMHG | SYSTOLIC BLOOD PRESSURE: 111 MMHG

## 2020-09-04 VITALS — DIASTOLIC BLOOD PRESSURE: 65 MMHG | SYSTOLIC BLOOD PRESSURE: 115 MMHG

## 2020-09-04 VITALS — DIASTOLIC BLOOD PRESSURE: 64 MMHG | SYSTOLIC BLOOD PRESSURE: 115 MMHG

## 2020-09-04 VITALS — DIASTOLIC BLOOD PRESSURE: 42 MMHG | SYSTOLIC BLOOD PRESSURE: 78 MMHG

## 2020-09-04 VITALS — DIASTOLIC BLOOD PRESSURE: 66 MMHG | SYSTOLIC BLOOD PRESSURE: 129 MMHG

## 2020-09-04 VITALS — SYSTOLIC BLOOD PRESSURE: 102 MMHG | DIASTOLIC BLOOD PRESSURE: 62 MMHG

## 2020-09-04 VITALS — SYSTOLIC BLOOD PRESSURE: 105 MMHG | DIASTOLIC BLOOD PRESSURE: 60 MMHG

## 2020-09-04 VITALS — SYSTOLIC BLOOD PRESSURE: 119 MMHG | DIASTOLIC BLOOD PRESSURE: 65 MMHG

## 2020-09-04 DIAGNOSIS — Z12.11: Primary | ICD-10-CM

## 2020-09-04 DIAGNOSIS — D12.5: ICD-10-CM

## 2020-09-04 PROCEDURE — 88305 TISSUE EXAM BY PATHOLOGIST: CPT

## 2020-09-04 RX ADMIN — MIDAZOLAM PRN MG: 1 INJECTION INTRAMUSCULAR; INTRAVENOUS at 08:45

## 2020-09-04 RX ADMIN — MIDAZOLAM PRN MG: 1 INJECTION INTRAMUSCULAR; INTRAVENOUS at 08:29

## 2020-09-04 RX ADMIN — MIDAZOLAM PRN MG: 1 INJECTION INTRAMUSCULAR; INTRAVENOUS at 08:32

## 2020-09-04 RX ADMIN — MIDAZOLAM PRN MG: 1 INJECTION INTRAMUSCULAR; INTRAVENOUS at 08:40

## 2020-09-04 NOTE — PRE-OP NOTE & CONSCIOUS SEDAT
Pre-Operative Progress Note


H&P Reviewed


The H&P was reviewed, patient examined and no changes noted.


Date H&P Reviewed:  Sep 4, 2020


Time H&P Reviewed:  07:55





Conscious Sedation Pre-Proced


ASA Score


1


For ASA 3 and 4: Consider anesthesia and medical clearance. Also, for patients

with a history of failed moderate sedation consider anesthesia.

















Airway 


 


Lungs 


 


Heart 


 


 ASA score


 


 ASA 1: a normal healthy patient


 


 ASA 2:  a patient with a mild systemic disease (mid diabetes, controlled 

hypertension, obesity 


 


 ASA 3:  a patient with a severe systemic disease that limits activity  (angina,

COPD, prior Myocardial infarction)


 


 ASA 4:  a patient with an incapacitating disease that is a constant threat to 

life (CHF, renal failure)


 


 ASA 5:  a moribund patient not expected to survive 24 hrs.  (ruptured aneurysm)


 


 ASA 6:  a declared brain-dead patient whose organs are being harvested.


 


 For emergent operations, add the letter E after the classification











Mallampati Classification


Grade 1





Sedation Plan


Analgesia, Amnesia, Plan communicated to team members, Discussed options with 

patient/fam, Discussed risks with patient/fam


The patient is an appropriate candidate to undergo the planned procedure, 

sedation, and anesthesia.





The patient immediately re-assessed prior to indication.











DREW GREY MD               Sep 4, 2020 10:19

## 2024-04-12 NOTE — OPERATIVE REPORT
DATE OF SERVICE:  



COLONOSCOPY SUMMARY



INDICATION FOR THE PROCEDURE:

Screening colonoscopy.



DESCRIPTION OF PROCEDURE:

The patient was placed in the left lateral decubitus position.  Prior to

undergoing colonoscopy, digital rectal evaluation was performed.  Anal sphincter

tone was normal and the perianal reflexes intact.  The prostate was not 
paolpable                                                                       
                                                          on digital inspection.
 No abnormalities were noted

on digital inspection of anal canal or distal rectal vault.  The colonoscope was

then inserted into the rectum and under direct visualization advanced to cecum. 

The cecum was identified by identification of the ileocecal valve and cecal

strap.  Photographic documentation was obtained.  Careful inspection was made as

the colonoscope was withdrawn.  The patient tolerated the procedure well.



FINDINGS:

There was no evidence for internal or external hemorrhoids and the rectum was

unremarkable.  Present in the mid sigmoid colon was a diminutive 2 mm sessile

polyp.  It was biopsied and ablated and submitted for histopathology with no

blood loss.  No evidence for diverticular disease was appreciated.  The

remainder of the sigmoid colon, descending colon, splenic flexure, transverse

colon, hepatic flexure, ascending colon and cecum were unremarkable.



ASSESSMENT:

One diminutive polyp was removed via hot forceps from the mid sigmoid colon. 

This is otherwise normal colonoscopy to the cecum.  The quality of prep was

good.  We will likely be advocating consideration for repeat screening

colonoscopy in 10 years as the patient is not aware of any family history of

colon cancer.





Job ID: [id number]

DocumentID: [id number]

Dictated Date:  09/04/2020 10:24:35

Transcription Date: 09/04/2020 18:27:29

Dictated By: DREW GREY MD

MTDD numerical [id number]